# Patient Record
Sex: FEMALE | Race: BLACK OR AFRICAN AMERICAN | NOT HISPANIC OR LATINO
[De-identification: names, ages, dates, MRNs, and addresses within clinical notes are randomized per-mention and may not be internally consistent; named-entity substitution may affect disease eponyms.]

---

## 2017-06-27 ENCOUNTER — APPOINTMENT (OUTPATIENT)
Dept: OBGYN | Facility: CLINIC | Age: 52
End: 2017-06-27

## 2017-06-27 ENCOUNTER — RESULT REVIEW (OUTPATIENT)
Age: 52
End: 2017-06-27

## 2017-08-26 ENCOUNTER — EMERGENCY (EMERGENCY)
Facility: HOSPITAL | Age: 52
LOS: 1 days | Discharge: LEFT BEFORE DISCHARGE | End: 2017-08-26
Attending: EMERGENCY MEDICINE | Admitting: EMERGENCY MEDICINE

## 2017-08-26 VITALS
SYSTOLIC BLOOD PRESSURE: 123 MMHG | DIASTOLIC BLOOD PRESSURE: 79 MMHG | TEMPERATURE: 98 F | HEART RATE: 77 BPM | WEIGHT: 199.96 LBS | RESPIRATION RATE: 16 BRPM | HEIGHT: 62 IN | OXYGEN SATURATION: 100 %

## 2017-08-26 NOTE — ED PROVIDER NOTE - PROGRESS NOTE DETAILS
Pt not in waiting room- went through intake and waiting room, and called pt home twice no answer.  Pt had been waiting short while to be seen

## 2018-10-26 NOTE — ED ADULT TRIAGE NOTE - STATUS:
Applied The history, relevant review of systems, past medical and surgical history, medical decision making, and physical examination was documented by the scribe in my presence and I attest to the accuracy of the documentation.

## 2019-07-13 ENCOUNTER — EMERGENCY (EMERGENCY)
Facility: HOSPITAL | Age: 54
LOS: 1 days | Discharge: HOME | End: 2019-07-13
Attending: EMERGENCY MEDICINE
Payer: MEDICAID

## 2019-07-13 VITALS
RESPIRATION RATE: 18 BRPM | HEART RATE: 91 BPM | SYSTOLIC BLOOD PRESSURE: 168 MMHG | OXYGEN SATURATION: 97 % | HEIGHT: 62 IN | DIASTOLIC BLOOD PRESSURE: 105 MMHG | TEMPERATURE: 98 F

## 2019-07-13 PROCEDURE — 99283 EMERGENCY DEPT VISIT LOW MDM: CPT

## 2019-07-13 PROCEDURE — 99282 EMERGENCY DEPT VISIT SF MDM: CPT

## 2019-07-13 NOTE — ED ADULT TRIAGE NOTE - HEIGHT IN INCHES
2 Positioning (Leave Blank If You Do Not Want): The patient was placed in a comfortable position exposing the surgical site.

## 2019-07-14 ENCOUNTER — EMERGENCY (EMERGENCY)
Facility: HOSPITAL | Age: 54
LOS: 1 days | Discharge: PSYCHIATRIC OR SUBSTANCE USE FACILITY | End: 2019-07-14
Attending: STUDENT IN AN ORGANIZED HEALTH CARE EDUCATION/TRAINING PROGRAM
Payer: MEDICAID

## 2019-07-14 VITALS
DIASTOLIC BLOOD PRESSURE: 77 MMHG | TEMPERATURE: 98 F | SYSTOLIC BLOOD PRESSURE: 133 MMHG | OXYGEN SATURATION: 95 % | HEART RATE: 85 BPM | RESPIRATION RATE: 18 BRPM

## 2019-07-14 VITALS
RESPIRATION RATE: 16 BRPM | SYSTOLIC BLOOD PRESSURE: 142 MMHG | OXYGEN SATURATION: 98 % | DIASTOLIC BLOOD PRESSURE: 85 MMHG | HEART RATE: 99 BPM | TEMPERATURE: 99 F

## 2019-07-14 LAB
ANION GAP SERPL CALC-SCNC: 15 MMOL/L — SIGNIFICANT CHANGE UP (ref 5–17)
APAP SERPL-MCNC: <15 UG/ML — SIGNIFICANT CHANGE UP (ref 10–30)
APPEARANCE UR: CLEAR — SIGNIFICANT CHANGE UP
BACTERIA # UR AUTO: NEGATIVE — SIGNIFICANT CHANGE UP
BASOPHILS # BLD AUTO: 0 K/UL — SIGNIFICANT CHANGE UP (ref 0–0.2)
BASOPHILS NFR BLD AUTO: 0.3 % — SIGNIFICANT CHANGE UP (ref 0–2)
BILIRUB UR-MCNC: NEGATIVE — SIGNIFICANT CHANGE UP
BUN SERPL-MCNC: 8 MG/DL — SIGNIFICANT CHANGE UP (ref 7–23)
CALCIUM SERPL-MCNC: 9.7 MG/DL — SIGNIFICANT CHANGE UP (ref 8.4–10.5)
CHLORIDE SERPL-SCNC: 104 MMOL/L — SIGNIFICANT CHANGE UP (ref 96–108)
CO2 SERPL-SCNC: 26 MMOL/L — SIGNIFICANT CHANGE UP (ref 22–31)
COLOR SPEC: SIGNIFICANT CHANGE UP
CREAT SERPL-MCNC: 0.68 MG/DL — SIGNIFICANT CHANGE UP (ref 0.5–1.3)
DIFF PNL FLD: NEGATIVE — SIGNIFICANT CHANGE UP
EOSINOPHIL # BLD AUTO: 0.1 K/UL — SIGNIFICANT CHANGE UP (ref 0–0.5)
EOSINOPHIL NFR BLD AUTO: 0.9 % — SIGNIFICANT CHANGE UP (ref 0–6)
EPI CELLS # UR: 1 /HPF — SIGNIFICANT CHANGE UP
ETHANOL SERPL-MCNC: SIGNIFICANT CHANGE UP MG/DL (ref 0–10)
GLUCOSE SERPL-MCNC: 137 MG/DL — HIGH (ref 70–99)
GLUCOSE UR QL: NEGATIVE — SIGNIFICANT CHANGE UP
HCT VFR BLD CALC: 40.9 % — SIGNIFICANT CHANGE UP (ref 34.5–45)
HGB BLD-MCNC: 14.3 G/DL — SIGNIFICANT CHANGE UP (ref 11.5–15.5)
HYALINE CASTS # UR AUTO: 0 /LPF — SIGNIFICANT CHANGE UP (ref 0–2)
KETONES UR-MCNC: NEGATIVE — SIGNIFICANT CHANGE UP
LEUKOCYTE ESTERASE UR-ACNC: NEGATIVE — SIGNIFICANT CHANGE UP
LYMPHOCYTES # BLD AUTO: 2.1 K/UL — SIGNIFICANT CHANGE UP (ref 1–3.3)
LYMPHOCYTES # BLD AUTO: 25.1 % — SIGNIFICANT CHANGE UP (ref 13–44)
MCHC RBC-ENTMCNC: 28.5 PG — SIGNIFICANT CHANGE UP (ref 27–34)
MCHC RBC-ENTMCNC: 34.8 GM/DL — SIGNIFICANT CHANGE UP (ref 32–36)
MCV RBC AUTO: 81.8 FL — SIGNIFICANT CHANGE UP (ref 80–100)
MONOCYTES # BLD AUTO: 0.6 K/UL — SIGNIFICANT CHANGE UP (ref 0–0.9)
MONOCYTES NFR BLD AUTO: 6.6 % — SIGNIFICANT CHANGE UP (ref 2–14)
NEUTROPHILS # BLD AUTO: 5.7 K/UL — SIGNIFICANT CHANGE UP (ref 1.8–7.4)
NEUTROPHILS NFR BLD AUTO: 67 % — SIGNIFICANT CHANGE UP (ref 43–77)
NITRITE UR-MCNC: NEGATIVE — SIGNIFICANT CHANGE UP
PH UR: 6 — SIGNIFICANT CHANGE UP (ref 5–8)
PLATELET # BLD AUTO: 313 K/UL — SIGNIFICANT CHANGE UP (ref 150–400)
POTASSIUM SERPL-MCNC: 3.4 MMOL/L — LOW (ref 3.5–5.3)
POTASSIUM SERPL-SCNC: 3.4 MMOL/L — LOW (ref 3.5–5.3)
PROT UR-MCNC: ABNORMAL
RBC # BLD: 5 M/UL — SIGNIFICANT CHANGE UP (ref 3.8–5.2)
RBC # FLD: 12.1 % — SIGNIFICANT CHANGE UP (ref 10.3–14.5)
RBC CASTS # UR COMP ASSIST: 2 /HPF — SIGNIFICANT CHANGE UP (ref 0–4)
SALICYLATES SERPL-MCNC: <2 MG/DL — LOW (ref 15–30)
SODIUM SERPL-SCNC: 145 MMOL/L — SIGNIFICANT CHANGE UP (ref 135–145)
SP GR SPEC: 1.01 — SIGNIFICANT CHANGE UP (ref 1.01–1.02)
TSH SERPL-MCNC: 1.7 UIU/ML — SIGNIFICANT CHANGE UP (ref 0.27–4.2)
UROBILINOGEN FLD QL: NEGATIVE — SIGNIFICANT CHANGE UP
WBC # BLD: 8.4 K/UL — SIGNIFICANT CHANGE UP (ref 3.8–10.5)
WBC # FLD AUTO: 8.4 K/UL — SIGNIFICANT CHANGE UP (ref 3.8–10.5)
WBC UR QL: 1 /HPF — SIGNIFICANT CHANGE UP (ref 0–5)

## 2019-07-14 PROCEDURE — 96372 THER/PROPH/DIAG INJ SC/IM: CPT

## 2019-07-14 PROCEDURE — 80307 DRUG TEST PRSMV CHEM ANLYZR: CPT

## 2019-07-14 PROCEDURE — 85027 COMPLETE CBC AUTOMATED: CPT

## 2019-07-14 PROCEDURE — 93005 ELECTROCARDIOGRAM TRACING: CPT

## 2019-07-14 PROCEDURE — 99285 EMERGENCY DEPT VISIT HI MDM: CPT

## 2019-07-14 PROCEDURE — 99285 EMERGENCY DEPT VISIT HI MDM: CPT | Mod: GC

## 2019-07-14 PROCEDURE — 80048 BASIC METABOLIC PNL TOTAL CA: CPT

## 2019-07-14 PROCEDURE — 99285 EMERGENCY DEPT VISIT HI MDM: CPT | Mod: 25

## 2019-07-14 PROCEDURE — 84443 ASSAY THYROID STIM HORMONE: CPT

## 2019-07-14 PROCEDURE — 93010 ELECTROCARDIOGRAM REPORT: CPT

## 2019-07-14 PROCEDURE — 81001 URINALYSIS AUTO W/SCOPE: CPT

## 2019-07-14 RX ORDER — HALOPERIDOL DECANOATE 100 MG/ML
5 INJECTION INTRAMUSCULAR ONCE
Refills: 0 | Status: COMPLETED | OUTPATIENT
Start: 2019-07-14 | End: 2019-07-14

## 2019-07-14 RX ORDER — MIDAZOLAM HYDROCHLORIDE 1 MG/ML
4 INJECTION, SOLUTION INTRAMUSCULAR; INTRAVENOUS ONCE
Refills: 0 | Status: COMPLETED | OUTPATIENT
Start: 2019-07-14 | End: 2019-07-14

## 2019-07-14 RX ADMIN — HALOPERIDOL DECANOATE 5 MILLIGRAM(S): 100 INJECTION INTRAMUSCULAR at 12:33

## 2019-07-14 RX ADMIN — Medication 2 MILLIGRAM(S): at 21:37

## 2019-07-14 RX ADMIN — HALOPERIDOL DECANOATE 5 MILLIGRAM(S): 100 INJECTION INTRAMUSCULAR at 21:36

## 2019-07-14 RX ADMIN — Medication 2 MILLIGRAM(S): at 12:33

## 2019-07-14 NOTE — ED PROVIDER NOTE - PROGRESS NOTE DETAILS
Sandy: Patient discharged, patient requesting ambulance to go home. advised will not be able to arrange ambulance given patient is ambulating.  patient agitated and upset with staff. got dressed and walking towards exit.

## 2019-07-14 NOTE — ED ADULT NURSE NOTE - CHPI ED NUR SYMPTOMS NEG
no back pain/no congestion/no shortness of breath/no chest pain/no chills/no fever/no syncope/no vomiting/no nausea/no diaphoresis
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
## 2019-07-14 NOTE — ED BEHAVIORAL HEALTH ASSESSMENT NOTE - SUICIDE RISK FACTORS
Unable to engage in safety planning/Global insomnia/Highly impulsive behavior/None Known Unable to engage in safety planning/Global insomnia/Highly impulsive behavior/Agitation/severe anxiety/None Known

## 2019-07-14 NOTE — ED BEHAVIORAL HEALTH ASSESSMENT NOTE - DESCRIPTION (FIRST USE, LAST USE, QUANTITY, FREQUENCY, DURATION)
Patient last smoked cigarette 3 days ago. She says she smokes about 4 cigarettes per day, depending on how she feels.

## 2019-07-14 NOTE — ED PROVIDER NOTE - CLINICAL SUMMARY MEDICAL DECISION MAKING FREE TEXT BOX
52 yo Female pmh htn, bipolar on risperdal, DM BIBEMS for abnormal behavior. patient exhibiting tangential speech and paranoia. Psych consult.

## 2019-07-14 NOTE — ED PROVIDER NOTE - OBJECTIVE STATEMENT
53F hx htn presents to ED after having a high bp reading at [facility]. Pt was prompted to go to [facility] bc she woke up with a nose bleed and was concerned it was her BP. She also endorses feeling "hot" this evening and was concerned that might be related to her high bp. Denies headache, vision changes, n/v/d.

## 2019-07-14 NOTE — ED BEHAVIORAL HEALTH ASSESSMENT NOTE - DESCRIPTION
Patient awaiting psychiatric assessment. HTN, DM type II, HLD domiciled alone Pt agitated upon arrival to [facility] ED, banging her hands on the doors, yelling loudly, received Haldol and Ativan IM PRN with good effect.    T(C): 37.2 (07-14-19 @ 10:32), Max: 37.2 (07-14-19 @ 10:32)  HR: 99 (07-14-19 @ 10:32) (85 - 99)  BP: 142/85 (07-14-19 @ 10:32) (133/77 - 168/105)  RR: 16 (07-14-19 @ 10:32) (16 - 18)  SpO2: 98% (07-14-19 @ 10:32) (95% - 98%)  Wt(kg): --

## 2019-07-14 NOTE — ED BEHAVIORAL HEALTH ASSESSMENT NOTE - PSYCHIATRIC ISSUES AND PLAN (INCLUDE STANDING AND PRN MEDICATION)
Restart Lamictal 25 mg qhs and Risperidone 5mg qhs for now given that it is unclear if patient has been taking lamictal. PRN q4hrs for agiation: Haldol 5mg, Benadryl 50mg, Ativan 2mg. For severe agitation IM: Haldol 5mg/benadryl 50mg/ativan 2mg. Hold Lamictal for now; compliance unclear.  Start Risperidone 1mg PO bid.  PRN: Haldol 5mg PO/IM q6h PRN agitation, Benadryl 50mg PO/IM q6h PRN EPS ppx, Ativan 2mg PO/IM q6h PRN agitation

## 2019-07-14 NOTE — ED ADULT NURSE NOTE - NSIMPLEMENTINTERV_GEN_ALL_ED
Implemented All Universal Safety Interventions:  [place] to call system. Call bell, personal items and telephone within reach. Instruct patient to call for assistance. Room bathroom lighting operational. Non-slip footwear when patient is off stretcher. Physically safe environment: no spills, clutter or unnecessary equipment. Stretcher in lowest position, wheels locked, appropriate side rails in place.
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
## 2019-07-14 NOTE — ED PROVIDER NOTE - NS ED ROS FT
General: denies fever, chills, weight loss/weight gain.  HENT: denies nasal congestion, sore throat, rhinorrhea, ear pain  Eyes: denies visual changes, blurred vision, eye discharge, eye redness  Neck: denies neck pain, neck swelling  CV: denies chest pain, palpitations  Resp: denies difficulty breathing, cough  Abdominal: denies nausea, vomiting, diarrhea, abdominal pain, blood in stool, dark stool  MSK: denies muscle aches, bony pain, leg pain, leg swelling  Neuro: denies headaches, numbness, tingling, dizziness, lightheadedness.  Skin: denies rashes, cuts, bruises  Hematologic: denies unexplained bruises

## 2019-07-14 NOTE — ED PROVIDER NOTE - PHYSICAL EXAMINATION
General appearance: NAD, conversant, afebrile    Eyes: anicteric sclerae, moist conjunctivae; no lid-lag; Pupils equal, round and reactive to light; Extraocular muscles intact   HENT: Atraumatic; oropharynx clear with moist mucous membranes and no mucosal ulcerations; normal hard and soft palate; no pharyngeal erythema or exudate   Neck: Trachea midline; Full range of motion, supple; no thyromegaly or lymphadenopathy   Pulm: Lungs clear to auscultation bilaterally, with normal respiratory effort and no intercostal retractions; normal work of breathing   CV: Regular Rhythm and Rate; Normal S1, S2; No murmurs, rubs, or gallops.    Abdomen: Soft, non-tender, non-distended;   Extremities: No peripheral edema or extremity lymphadenopathy.    Skin: Normal temperature, turgor and texture; no rash, ulcers or subcutaneous nodules   Psych: exhibiting tangential speech, paranoia.
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
## 2019-07-14 NOTE — ED BEHAVIORAL HEALTH ASSESSMENT NOTE - NS ED BHA PLAN ADMIT TO PSYCHIATRY BH CONTACTED FT
Patient does not recall name of outpatient provider, but says she goes to [facility].

## 2019-07-14 NOTE — ED BEHAVIORAL HEALTH ASSESSMENT NOTE - OTHER
superficially cooperative intermittently intense eye contact some paranoia suspected no female beds available, will board in ER [id number]

## 2019-07-14 NOTE — ED ADULT NURSE REASSESSMENT NOTE - NS ED NURSE REASSESS COMMENT FT1
Pt assisted t the bathroom by 1:1, pt started to run down the hallway and elope, pt stopped by 1:1 and RN, security called. RN assisted pt back to her room. Pt told RN to evacuate everyone in the building because there will be an emergency. RN reassured and spoke calmly with patient. PT now remains calm in room 28 with 1:1 at bedside consistently observing patient. Safety and comfort measures maintained.

## 2019-07-14 NOTE — ED BEHAVIORAL HEALTH ASSESSMENT NOTE - DETAILS
no contact info- Pt's outpatient psychiatrist was informed of admission bipolar disorder and schizoaffective disorder patient describes intermittent episodes of feeling hot bed not yet identified n/a

## 2019-07-14 NOTE — ED BEHAVIORAL HEALTH ASSESSMENT NOTE - RISK ASSESSMENT
Risk factors: diagnosis of SAD, prior inpatient psychiatric hospitalizations, h/o medication noncompliance, poor judgment and poor insight.

## 2019-07-14 NOTE — ED ADULT NURSE REASSESSMENT NOTE - NS ED NURSE REASSESS COMMENT FT1
Report received from LUISA Barrera. Upon reassessment pt resting quietly in room 28 with 1:1 at bedside. Pt remains calm at this time. Pt aware that she is to be admitted and is waiting for a bed. Safety and comfort measures maintained.

## 2019-07-14 NOTE — ED BEHAVIORAL HEALTH ASSESSMENT NOTE - AXIS IV
Problems with primary support/Problem related to social environment/Economic problems/Housing problems

## 2019-07-14 NOTE — ED BEHAVIORAL HEALTH ASSESSMENT NOTE - CURRENT MEDICATION
Risperidone and lamictal 25mg. Confirmed with [facility]: lisinopril 10mg daily; amlodipine 10mg daily; simvastatin 10mg PO qHS; lamictal (last filled in June) 25mg x4 tabs daily; risperdal 1mg daily and 4mg PO qHS

## 2019-07-14 NOTE — ED PROVIDER NOTE - CLINICAL SUMMARY MEDICAL DECISION MAKING FREE TEXT BOX
53F p/w asymptomatic htn. Well appearing, in no distress. Heart and lung sounds unremarkable. Not hypertensive here. Likely DC. 53F p/w asymptomatic htn. Well appearing, in no distress. Heart and lung sounds unremarkable. Not hypertensive here. Likely DC.  Sandy: 53 year old female with asymptomatic htn here concerned about bp. went [facility] and [facility].  no cp, no sob. no n/v.  will d/c home.
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
## 2019-07-14 NOTE — ED ADULT NURSE REASSESSMENT NOTE - NS ED NURSE REASSESS COMMENT FT1
Pt was pacing around the room knocking on the walls, pt asked if she could have a rifle for target practice. Pt tried to stand on the chair, chair was removed from the room. 1:1 remains at bedside constantly observing. Pt given 5 of haldol and 2 of ativan to calm down, pt calm while receiving medication. Pt remains on floor as per pt request, pt educated that she should be sitting on the bench or sleeping on the bench. Pt made comfortable. Safety and comfort measures maintained.

## 2019-07-14 NOTE — ED BEHAVIORAL HEALTH ASSESSMENT NOTE - MEDICAL ISSUES AND PLAN (INCLUDE STANDING AND PRN MEDICATION)
lisinopril 10 mg PO daily, Loratadine 10 mg OP daily, simvastatin 10 mg PO qHS, amLODIPine 5 mg PO daily lisinopril 10mg daily; amlodipine 10mg daily; simvastatin 10mg PO qHS

## 2019-07-14 NOTE — ED PROVIDER NOTE - ATTENDING CONTRIBUTION TO CARE
53 yof pmh htn, bipolar biba found in convenience store acting erratic taking off clothing and not following commands. patient is uncooperative with exam and intermittently answering questions. Denies headache, vomiting, chest pain, shortness of breath, abd pain.     Vital Signs Stable  Gen: well appearing, NAD  HEENT: no conjunctivitis, MMM, neck supple  Cardiac: regular rate rhythm, normal S1S2  Chest: CTA BL, no wheezes or crackles  Abdomen: normal BS, soft, non tender non distended  Extremity: no gross deformity, good perfusion  Skin: no rash  Neuro: grossly normal    AP: constant observation, labs, urine toxicology, EKG, psychiatry consult, dispo pending. 53 yof pmh htn, bipolar biba found in convenience store acting erratic, taking off clothing, running around and not following commands. patient is uncooperative with exam and intermittently answering questions. mood labile and tangential in speech. Denies headache, vomiting, chest pain, shortness of breath, abd pain. Was seen in this ED recently for asymptomatic HTN.     Vital Signs Stable  Gen: well appearing, NAD  HEENT: no conjunctivitis, MMM, neck supple  Cardiac: regular rate rhythm, normal S1S2  Chest: CTA BL, no wheezes or crackles  Abdomen: normal BS, soft, non tender non distended  Extremity: no gross deformity, good perfusion  Skin: no rash  Neuro: grossly normal    AP: does not endorse medical complaints, constant observation, labs, urine toxicology, EKG, psychiatry consult, dispo pending.

## 2019-07-14 NOTE — ED PROVIDER NOTE - PMH
Bipolar disorder    Hypertension, unspecified type    Type 2 diabetes mellitus with complication, without long-term current use of insulin

## 2019-07-14 NOTE — ED PROVIDER NOTE - PROGRESS NOTE DETAILS
Yumi: Patient admitted to inpatient with 2PC consent, but without any beds available. Appears comfortable, walking around the hospital. Rock PGY3: awaiting bed, pt persistently folding things. Rock PGY3: Pt climbing on chair trying to reach button on ceiling, then banging on door, states she thinks there will be an emergency soon and asking about weapons, also tried to leave room, had to be redirected back, sedated for saftey Attending MD Drew: Vitals reviewed, patient sleeping, no acute issues during shift.  Will sign out to AM team, Dr. Denson. KENNY Denson MD: Rec'd signout on pt who is resting comfortably at this time, s/p 2PC, awaiting inpt psychiatry bed.

## 2019-07-14 NOTE — ED ADULT NURSE NOTE - OBJECTIVE STATEMENT
53 year old female presents to the ED via EMS from home complaining of HTN at 11:30am at [facility] (found incidentally while waiting for BP rx to be filled). PMH  of HTN and Bipolar Disorder. Patient states she does not know what her BP was at [facility], but "top number was over 100." Pt. endorses brief period of dizziness at [facility] but was able to drive there and home without difficulty and felt fine at home all day, went to sleep, woke up around 11pm and noticed her face was "warm, almost sweating" and called 911. Pt. has a well visit appointment on 7/22/19 but felt that she needed to come to the ED to be seen. On assessment, VSS, patient is not hypertensive. Pt. denies dizziness, chest pain, nausea, vomiting, diarrhea, dysuria, hematuria, recent travel, recent sick contacts. Patient undressed and placed into gown and side rails up with bed in lowest position for safety. blanket provided. Comfort and safety provided.

## 2019-07-15 VITALS
SYSTOLIC BLOOD PRESSURE: 128 MMHG | HEART RATE: 87 BPM | TEMPERATURE: 98 F | OXYGEN SATURATION: 97 % | DIASTOLIC BLOOD PRESSURE: 82 MMHG | RESPIRATION RATE: 18 BRPM
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
## 2019-07-15 NOTE — CHART NOTE - NSCHARTNOTEFT_GEN_A_CORE
EMERGENCY ROOM SOCIAL WORK: Chart reviewed for psych transfer. Patient is a 52 y/o, female with PPH of Schizoaffective disorder presented to the ED on 7/14 due to agitation at work. Per  Psych, patient requires inpatient mental health for safety and stabilization. Admitting diagnosis is Schizoaffective disorder. No beds available for psych transfer. Patient accepted to [facility] on 7/15. Accepting MD is Dr. Elizabeth Chou. CL Psych informed of transfer details. ED MD completed disposition.  RN contacted [facility] with report. LMSW met with patient at the bedside to inform of transfer details. Patient in agreement with plan. Patient awaiting transport to [facility].

## 2019-07-15 NOTE — ED ADULT NURSE REASSESSMENT NOTE - NS ED NURSE REASSESS COMMENT FT1
Pt remains asleep at this time. 1:1 continuously at bedside. Safety and comfort measures maintained.

## 2019-07-15 NOTE — ED ADULT NURSE REASSESSMENT NOTE - NS ED NURSE REASSESS COMMENT FT1
Pt remains asleep at this time. 1:1 continuously observing patient. Safety and comfort measures maintained.

## 2020-02-03 NOTE — ED ADULT NURSE NOTE - ALCOHOL PRE SCREEN (AUDIT - C)
----- Message from Rosina Whitaker MABEL   Statement Selected

## 2023-05-15 ENCOUNTER — APPOINTMENT (OUTPATIENT)
Dept: SURGERY | Facility: CLINIC | Age: 58
End: 2023-05-15
Payer: MEDICARE

## 2023-05-15 VITALS
SYSTOLIC BLOOD PRESSURE: 120 MMHG | TEMPERATURE: 97.1 F | HEART RATE: 97 BPM | HEIGHT: 62 IN | DIASTOLIC BLOOD PRESSURE: 83 MMHG | WEIGHT: 187 LBS | BODY MASS INDEX: 34.41 KG/M2 | OXYGEN SATURATION: 96 %

## 2023-05-15 DIAGNOSIS — F17.200 NICOTINE DEPENDENCE, UNSPECIFIED, UNCOMPLICATED: ICD-10-CM

## 2023-05-15 DIAGNOSIS — I10 ESSENTIAL (PRIMARY) HYPERTENSION: ICD-10-CM

## 2023-05-15 DIAGNOSIS — E66.9 OBESITY, UNSPECIFIED: ICD-10-CM

## 2023-05-15 PROBLEM — E11.8 TYPE 2 DIABETES MELLITUS WITH UNSPECIFIED COMPLICATIONS: Chronic | Status: ACTIVE | Noted: 2019-07-14

## 2023-05-15 PROCEDURE — 99203 OFFICE O/P NEW LOW 30 MIN: CPT

## 2023-05-16 NOTE — CONSULT LETTER
[Dear  ___] : Dear  [unfilled], [Sincerely,] : Sincerely, [FreeTextEntry1] : I saw Heavenly Lundberg in the office emergently today.  She has had a small cellulitic area in her left lateral hip region.  There is minimal serous drainage associated with this.  She denies any fever chills or night sweats.  She denies any pain.\par \par Her past medical history is significant for noninstrumented diabetes, hypertension, hypercholesterolemia, anxiety, and morbid obesity.  Her past surgical history is significant for tonsillectomy as a child and benign colonoscopy in .  She smokes a half a pack of cigarettes per day and been doing so for 13 years.  She denies use of alcohol.  She does not work.  Her present medications include vitamin D, Rybelsus, Jardiance, Cogentin, lisinopril, Lamictal, Haldol, meloxicam, simvastatin, Norvasc, Claritin, and Tylenol.  He has no known drug allergies.  Her father succumbed to heart attack at age 64 and her mother  from complications of dementia at age 87.  A review of systems was performed and documented.\par \par Physical Exam:  General: No acute distress, conversant, and well-nourished.  Respiratory: Lungs are clear to auscultation with good inspiratory effort.  Cardiovascular: Heart is regular rate and rhythm with no murmurs.  Abdomen: Soft and nontender.  Skin: Good color, turgor, texture with no gross lesions.  Psychiatric: Awake, alert and oriented x3 with an appropriate affect.\par \par Pertinent physical findings: He has a 1 cm cellulitic area in the skin of the left lateral hip region.  It is draining minimal serous material.\par \par Impression: Low-grade cellulitis left lateral hip region spontaneously draining.\par \par Plan: Warm packs.  Continue antibiotics as prescribed by yourself.  Follow-up in 10 to 14 days. [FreeTextEntry3] : Dean Jones D.O., BRITTANY, FACS\par , Surgery [facility]\par  Surgery [facility]

## 2023-05-30 ENCOUNTER — APPOINTMENT (OUTPATIENT)
Dept: SURGERY | Facility: CLINIC | Age: 58
End: 2023-05-30
Payer: MEDICARE

## 2023-05-30 VITALS
OXYGEN SATURATION: 98 % | BODY MASS INDEX: 34.41 KG/M2 | SYSTOLIC BLOOD PRESSURE: 109 MMHG | DIASTOLIC BLOOD PRESSURE: 74 MMHG | WEIGHT: 187 LBS | HEIGHT: 62 IN | TEMPERATURE: 97.4 F | HEART RATE: 96 BPM

## 2023-05-30 DIAGNOSIS — L03.116 CELLULITIS OF LEFT LOWER LIMB: ICD-10-CM

## 2023-05-30 DIAGNOSIS — L02.416 CELLULITIS OF LEFT LOWER LIMB: ICD-10-CM

## 2023-05-30 PROCEDURE — 99213 OFFICE O/P EST LOW 20 MIN: CPT

## 2023-05-31 NOTE — CONSULT LETTER
[Dear  ___] : Dear  [unfilled], [Sincerely,] : Sincerely, [FreeTextEntry1] : I saw Heavenly Lundberg in follow-up visit in the office today.  The cellulitis in her left lateral hip region has completely subsided.\par \par Her past medical history is significant for noninstrumented diabetes, hypertension, hypercholesterolemia, anxiety, and morbid obesity.  Her past surgical history is significant for tonsillectomy as a child and benign colonoscopy in .  She smokes a half a pack of cigarettes per day and been doing so for 13 years.  She denies use of alcohol.  She does not work.  Her present medications include vitamin D, Rybelsus, Jardiance, Cogentin, lisinopril, Lamictal, Haldol, meloxicam, simvastatin, Norvasc, Claritin, and Tylenol.  He has no known drug allergies.  Her father succumbed to heart attack at age 64 and her mother  from complications of dementia at age 87.  A review of systems was performed and documented.\par \par Physical Exam:  General: No acute distress, conversant, and well-nourished.  Respiratory: Lungs are clear to auscultation with good inspiratory effort.  Cardiovascular: Heart is regular rate and rhythm with no murmurs.  Abdomen: Soft and nontender.  Skin: Good color, turgor, texture with no gross lesions.  Psychiatric: Awake, alert and oriented x3 with an appropriate affect.\par \par Pertinent physical findings: Cellulitis left lateral hip region completely subsided.\par \par Impression: Resolved cellulitis left lateral hip region.\par \par Plan: No further surgical follow-up needed. [FreeTextEntry3] : Dean Jones D.O., BRITTANY, FACS\par , Surgery [facility]\par  Surgery [facility]
